# Patient Record
(demographics unavailable — no encounter records)

---

## 2024-11-27 NOTE — ASSESSMENT
[FreeTextEntry1] : MEDICATION RECONCILIATION DONE *Immunizations COVID -19 TOTAL VACCINE  2   Influenza  9/2024 Pneumococcal -2017 TDAP -  decline shingles vaccine decline EKG done for HCM at this office today;  NSR no acute ST-T changes Lab ( venipuncture ) done today at this office Preventive medicine discussed - including importance of lifestyle modification - with incorporation of healthy diet,  recommended Diet low fat diet + regular exercise con't calcium 1200-1500mg and vit D 8670-6318 IU daily and regular weight bearing exercise for OP prevention weight loss counseling provided as above Depression screening WITH the Patient Health Questionnaire-2 (PHQ-2), THE RESULT WAS NEGATIVE. The benefits of adequate calcium intake and routine daily cardiovascular exercise were reviewed with the patient.  The patient was informed regarding the benefits of a YEARLY screening FOR SKIN CANCER -Recommended following up with dermatology for annual skin surveillance. -Recommended following up with GYN for annual surveillance. Recommended following up with dental, opthalmology  The importance of safer-sex was discussed with the patient. DISCUSSED PRECAUTIONS AGAINST COVID19, INCLUDING MASK WEARING, SOCIAL DISTANCING AND HAND WASHING. -Follow up in 1 year for CPE / annual exam or PRN. All questions and concerns were discussed.

## 2024-11-27 NOTE — HEALTH RISK ASSESSMENT
[Fair] :  ~his/her~ mood as fair [Yes] : Yes [Monthly or less (1 pt)] : Monthly or less (1 point) [No falls in past year] : Patient reported no falls in the past year [0] : 2) Feeling down, depressed, or hopeless: Not at all (0) [PHQ-2 Negative - No further assessment needed] : PHQ-2 Negative - No further assessment needed [Patient reported mammogram was normal] : Patient reported mammogram was normal [Patient reported PAP Smear was normal] : Patient reported PAP Smear was normal [Patient reported bone density results were normal] : Patient reported bone density results were normal [Patient reported colonoscopy was normal] : Patient reported colonoscopy was normal [HIV test declined] : HIV test declined [Hepatitis C test declined] : Hepatitis C test declined [Fully functional (bathing, dressing, toileting, transferring, walking, feeding)] : Fully functional (bathing, dressing, toileting, transferring, walking, feeding) [Fully functional (using the telephone, shopping, preparing meals, housekeeping, doing laundry, using] : Fully functional and needs no help or supervision to perform IADLs (using the telephone, shopping, preparing meals, housekeeping, doing laundry, using transportation, managing medications and managing finances) [With Patient/Caregiver] : , with patient/caregiver [[id number]] : 0 [MammogramDate] : 2018 [PapSmearDate] : 2023 [BoneDensityDate] : 2018 [ColonoscopyDate] : 2024 [AdvancecareDate] : 11/2024

## 2024-11-27 NOTE — HISTORY OF PRESENT ILLNESS
[FreeTextEntry1] : HERE FOR Comprehensive annual examination  [de-identified] : diet- healthy exercise- walking, 1 hr 2-3 times/ wk. Has been in good overall health since his last CPE. h/o htn